# Patient Record
Sex: FEMALE | Race: BLACK OR AFRICAN AMERICAN | NOT HISPANIC OR LATINO | Employment: FULL TIME | URBAN - METROPOLITAN AREA
[De-identification: names, ages, dates, MRNs, and addresses within clinical notes are randomized per-mention and may not be internally consistent; named-entity substitution may affect disease eponyms.]

---

## 2023-04-29 ENCOUNTER — HOSPITAL ENCOUNTER (EMERGENCY)
Facility: CLINIC | Age: 23
Discharge: LEFT BEFORE DISCHARGE | End: 2023-04-29
Attending: EMERGENCY MEDICINE | Admitting: EMERGENCY MEDICINE
Payer: COMMERCIAL

## 2023-04-29 VITALS
BODY MASS INDEX: 30.73 KG/M2 | WEIGHT: 180 LBS | SYSTOLIC BLOOD PRESSURE: 169 MMHG | HEIGHT: 64 IN | DIASTOLIC BLOOD PRESSURE: 110 MMHG | OXYGEN SATURATION: 99 % | HEART RATE: 91 BPM | RESPIRATION RATE: 20 BRPM | TEMPERATURE: 98.1 F

## 2023-04-29 DIAGNOSIS — E87.20 METABOLIC ACIDEMIA: ICD-10-CM

## 2023-04-29 DIAGNOSIS — R73.9 HYPERGLYCEMIA: ICD-10-CM

## 2023-04-29 DIAGNOSIS — Z02.89: ICD-10-CM

## 2023-04-29 LAB
ANION GAP SERPL CALCULATED.3IONS-SCNC: 22 MMOL/L (ref 7–15)
BASOPHILS # BLD AUTO: 0 10E3/UL (ref 0–0.2)
BASOPHILS NFR BLD AUTO: 1 %
BUN SERPL-MCNC: 7.9 MG/DL (ref 6–20)
CALCIUM SERPL-MCNC: 9.6 MG/DL (ref 8.6–10)
CHLORIDE SERPL-SCNC: 96 MMOL/L (ref 98–107)
CREAT SERPL-MCNC: 0.47 MG/DL (ref 0.51–0.95)
DEPRECATED HCO3 PLAS-SCNC: 17 MMOL/L (ref 22–29)
EOSINOPHIL # BLD AUTO: 0 10E3/UL (ref 0–0.7)
EOSINOPHIL NFR BLD AUTO: 1 %
ERYTHROCYTE [DISTWIDTH] IN BLOOD BY AUTOMATED COUNT: 11.7 % (ref 10–15)
GFR SERPL CREATININE-BSD FRML MDRD: >90 ML/MIN/1.73M2
GLUCOSE BLDC GLUCOMTR-MCNC: 233 MG/DL (ref 70–99)
GLUCOSE SERPL-MCNC: 252 MG/DL (ref 70–99)
HCT VFR BLD AUTO: 45.3 % (ref 35–47)
HGB BLD-MCNC: 14.7 G/DL (ref 11.7–15.7)
IMM GRANULOCYTES # BLD: 0 10E3/UL
IMM GRANULOCYTES NFR BLD: 0 %
LYMPHOCYTES # BLD AUTO: 2.3 10E3/UL (ref 0.8–5.3)
LYMPHOCYTES NFR BLD AUTO: 36 %
MCH RBC QN AUTO: 33.1 PG (ref 26.5–33)
MCHC RBC AUTO-ENTMCNC: 32.5 G/DL (ref 31.5–36.5)
MCV RBC AUTO: 102 FL (ref 78–100)
MONOCYTES # BLD AUTO: 0.2 10E3/UL (ref 0–1.3)
MONOCYTES NFR BLD AUTO: 3 %
NEUTROPHILS # BLD AUTO: 3.8 10E3/UL (ref 1.6–8.3)
NEUTROPHILS NFR BLD AUTO: 59 %
NRBC # BLD AUTO: 0 10E3/UL
NRBC BLD AUTO-RTO: 0 /100
PLATELET # BLD AUTO: 256 10E3/UL (ref 150–450)
POTASSIUM SERPL-SCNC: 3.9 MMOL/L (ref 3.4–5.3)
RBC # BLD AUTO: 4.44 10E6/UL (ref 3.8–5.2)
SODIUM SERPL-SCNC: 135 MMOL/L (ref 136–145)
WBC # BLD AUTO: 6.3 10E3/UL (ref 4–11)

## 2023-04-29 PROCEDURE — 80048 BASIC METABOLIC PNL TOTAL CA: CPT | Performed by: EMERGENCY MEDICINE

## 2023-04-29 PROCEDURE — 85004 AUTOMATED DIFF WBC COUNT: CPT | Performed by: EMERGENCY MEDICINE

## 2023-04-29 PROCEDURE — 99284 EMERGENCY DEPT VISIT MOD MDM: CPT | Mod: 25

## 2023-04-29 PROCEDURE — 36415 COLL VENOUS BLD VENIPUNCTURE: CPT | Performed by: EMERGENCY MEDICINE

## 2023-04-29 PROCEDURE — 96360 HYDRATION IV INFUSION INIT: CPT

## 2023-04-29 PROCEDURE — 258N000003 HC RX IP 258 OP 636: Performed by: EMERGENCY MEDICINE

## 2023-04-29 PROCEDURE — 82962 GLUCOSE BLOOD TEST: CPT

## 2023-04-29 RX ORDER — SODIUM CHLORIDE 9 MG/ML
INJECTION, SOLUTION INTRAVENOUS CONTINUOUS
Status: DISCONTINUED | OUTPATIENT
Start: 2023-04-29 | End: 2023-04-29 | Stop reason: HOSPADM

## 2023-04-29 RX ORDER — INSULIN GLARGINE 100 [IU]/ML
35 INJECTION, SOLUTION SUBCUTANEOUS AT BEDTIME
Qty: 10.5 ML | Refills: 0 | Status: SHIPPED | OUTPATIENT
Start: 2023-04-29 | End: 2023-05-29

## 2023-04-29 RX ORDER — INSULIN LISPRO 100 [IU]/ML
30 INJECTION, SOLUTION INTRAVENOUS; SUBCUTANEOUS
Qty: 27 ML | Refills: 0 | Status: SHIPPED | OUTPATIENT
Start: 2023-04-29 | End: 2023-05-29

## 2023-04-29 RX ADMIN — SODIUM CHLORIDE 1000 ML: 9 INJECTION, SOLUTION INTRAVENOUS at 03:07

## 2023-04-29 ASSESSMENT — ACTIVITIES OF DAILY LIVING (ADL): ADLS_ACUITY_SCORE: 33

## 2023-04-29 ASSESSMENT — ENCOUNTER SYMPTOMS: HEADACHES: 1

## 2023-04-29 NOTE — ED NOTES
Pt requesting that her IV be taken out, that she wants to leave pt only received 400cc of her IVFs and her lab results were not completed yet, pt did request a note for school today

## 2023-04-29 NOTE — ED PROVIDER NOTES
"  History     Chief Complaint:  Blood Sugar Problem       HPI   Slade Brian is a 22 year old female that only reason in ER was arrested and legal blood draw.  No sx.  In course of arrest she states with a history of type 1 diabetes mellitus who presents with concerns of her blood sugar levels. She explains that she has not been taking her insulin for the past couple weeks as she ran out of both of her long and fast acting insulin. At the time of my examination, she reports experiencing headaches. She denies any bowel or urinary symptoms.       Independent Historian:   None - Patient Only    Review of External Notes: Reviewed the patient's endocrinology notes.    ROS:  Review of Systems   Neurological: Positive for headaches.   All other systems reviewed and are negative.    Allergies:  No Known Allergies     Medications:    Insulin lispro  Insulin glargine  Lexapro    Past Medical History:    Class 1 obesity  Type 1 diabetes mellitus    Past Surgical History:    History reviewed. No past surgical history.      Family History:    History reviewed. No pertinent family history.     Social History:  Presents to the ED alone.  PCP: No primary care provider on file.     Physical Exam     Patient Vitals for the past 24 hrs:   BP Temp Temp src Pulse Resp SpO2 Height Weight   04/29/23 0147 (!) 169/110 98.1  F (36.7  C) Temporal 91 20 99 % 1.626 m (5' 4\") 81.6 kg (180 lb)     Physical Exam  General: Patient is well appearing. No distress.  Does not appear in any way to be acidemic or in any symptoms  Head: Atraumatic.  Eyes: Conjunctivae  are normal. No scleral icterus.  Neck: Normal range of motion. Neck supple.   Cardiovascular: Normal rate, regular rhythm, normal heart sounds and intact distal pulses.   Pulmonary/Chest: Breath sounds normal. No respiratory distress.  Abdominal: Soft. Bowel sounds are normal. No distension. No tenderness. No rebound or guarding.   Musculoskeletal: Normal range of motion.  Skin: Warm " and dry. No rash noted. Not diaphoretic.     Emergency Department Course     Laboratory:  Labs Ordered and Resulted from Time of ED Arrival to Time of ED Departure   GLUCOSE BY METER - Abnormal       Result Value    GLUCOSE BY METER POCT 233 (*)    BASIC METABOLIC PANEL - Abnormal    Sodium 135 (*)     Potassium 3.9      Chloride 96 (*)     Carbon Dioxide (CO2) 17 (*)     Anion Gap 22 (*)     Urea Nitrogen 7.9      Creatinine 0.47 (*)     Calcium 9.6      Glucose 252 (*)     GFR Estimate >90     CBC WITH PLATELETS AND DIFFERENTIAL - Abnormal    WBC Count 6.3      RBC Count 4.44      Hemoglobin 14.7      Hematocrit 45.3       (*)     MCH 33.1 (*)     MCHC 32.5      RDW 11.7      Platelet Count 256      % Neutrophils 59      % Lymphocytes 36      % Monocytes 3      % Eosinophils 1      % Basophils 1      % Immature Granulocytes 0      NRBCs per 100 WBC 0      Absolute Neutrophils 3.8      Absolute Lymphocytes 2.3      Absolute Monocytes 0.2      Absolute Eosinophils 0.0      Absolute Basophils 0.0      Absolute Immature Granulocytes 0.0      Absolute NRBCs 0.0       Emergency Department Course & Assessments:  Interventions:  Medications   0.9% sodium chloride BOLUS (0 mLs Intravenous Stopped 4/29/23 0344)     Followed by   sodium chloride 0.9% infusion (has no administration in time range)     Assessments:  0259 I obtained history and examined the patient as noted above.     Independent Interpretation (X-rays, CTs, rhythm strip):  None    Consultations/Discussion of Management or Tests:  None     Social Determinants of Health affecting care:   None and Healthcare Access/Compliance    Disposition:  The patient was discharged to home.     Impression & Plan    CMS Diagnoses: None    Medical Decision Making:  Slade Brian is a 22 year old female who presents for evaluation of elevated blood sugar.  Once her boyfriend arrived and police lef there here she refused to stay.  She did have some minor metabolic  derangement by labs but again looks very well outwardly.  She eloped prior to completion.   I provided her with prescriptions for her usual insulin glargine and lispro.           Critical Care time:  was 0 minutes for this patient excluding procedures.    Diagnosis:    ICD-10-CM    1. Hyperglycemia  R73.9       2. Metabolic acidemia  E87.20       3. Encounter for  health examination  Z02.89         Discharge Medications:  New Prescriptions    INSULIN GLARGINE (LANTUS VIAL) 100 UNIT/ML VIAL    Inject 35 Units Subcutaneous At Bedtime for 30 days    INSULIN LISPRO (HUMALOG KWIKPEN) 100 UNIT/ML (1 UNIT DIAL) KWIKPEN    Inject 30 Units Subcutaneous 3 times daily (before meals) for 30 days     Scribe Disclosure:  I, Santino Gifford, am serving as a scribe at 2:55 AM on 4/29/2023 to document services personally performed by Morales Chan MD based on my observations and the provider's statements to me.   4/29/2023   Morales Chan MD Stevens, Andrew C, MD  04/29/23 0646

## 2023-04-29 NOTE — LETTER
April 29, 2023      To Whom It May Concern:      Maalvajustyn Brian was seen in our Emergency Department today, 04/29/23.  Please excuse her from school today.    Sincerely,        Denice Martinez RN

## 2023-04-29 NOTE — ED TRIAGE NOTES
Pt reports she thinks per blood sugar is really high. Pt hasnt taken her medication in awhile. Pt her with PD for legal blood draw